# Patient Record
Sex: FEMALE | Race: WHITE | NOT HISPANIC OR LATINO | Employment: OTHER | URBAN - NONMETROPOLITAN AREA
[De-identification: names, ages, dates, MRNs, and addresses within clinical notes are randomized per-mention and may not be internally consistent; named-entity substitution may affect disease eponyms.]

---

## 2022-05-19 ENCOUNTER — OFFICE VISIT (OUTPATIENT)
Dept: NEUROLOGY | Facility: OTHER | Age: 37
End: 2022-05-19
Attending: PSYCHIATRY & NEUROLOGY
Payer: COMMERCIAL

## 2022-05-19 VITALS
OXYGEN SATURATION: 99 % | DIASTOLIC BLOOD PRESSURE: 72 MMHG | WEIGHT: 158 LBS | RESPIRATION RATE: 16 BRPM | SYSTOLIC BLOOD PRESSURE: 130 MMHG | TEMPERATURE: 97.4 F | HEART RATE: 80 BPM

## 2022-05-19 DIAGNOSIS — M79.661 PAIN OF RIGHT LOWER LEG: Primary | ICD-10-CM

## 2022-05-19 DIAGNOSIS — M79.641 PAIN OF RIGHT HAND: ICD-10-CM

## 2022-05-19 DIAGNOSIS — G83.11 MONOPLEGIA OF LOWER EXTREMITY DUE TO NONCEREBROVASCULAR ETIOLOGY AFFECTING RIGHT DOMINANT SIDE (H): ICD-10-CM

## 2022-05-19 PROBLEM — K21.9 GASTROESOPHAGEAL REFLUX DISEASE: Status: ACTIVE | Noted: 2019-09-26

## 2022-05-19 PROBLEM — H51.11 CI (CONVERGENCE INSUFFICIENCY): Status: ACTIVE | Noted: 2018-07-11

## 2022-05-19 PROBLEM — H52.203 HYPEROPIA OF BOTH EYES WITH ASTIGMATISM: Status: ACTIVE | Noted: 2018-07-11

## 2022-05-19 PROBLEM — N13.70 URETERAL REFLUX: Status: ACTIVE | Noted: 2017-11-07

## 2022-05-19 PROBLEM — H52.03 HYPEROPIA OF BOTH EYES WITH ASTIGMATISM: Status: ACTIVE | Noted: 2018-07-11

## 2022-05-19 PROBLEM — R14.0 ABDOMINAL BLOATING WITH CRAMPS: Status: ACTIVE | Noted: 2020-07-08

## 2022-05-19 PROBLEM — H50.52 EXOPHORIA: Status: ACTIVE | Noted: 2018-07-11

## 2022-05-19 PROBLEM — R10.9 ABDOMINAL BLOATING WITH CRAMPS: Status: ACTIVE | Noted: 2020-07-08

## 2022-05-19 PROCEDURE — 95886 MUSC TEST DONE W/N TEST COMP: CPT | Performed by: PSYCHIATRY & NEUROLOGY

## 2022-05-19 PROCEDURE — 99204 OFFICE O/P NEW MOD 45 MIN: CPT | Mod: 25 | Performed by: PSYCHIATRY & NEUROLOGY

## 2022-05-19 PROCEDURE — 95913 NRV CNDJ TEST 13/> STUDIES: CPT | Performed by: PSYCHIATRY & NEUROLOGY

## 2022-05-19 RX ORDER — ASENAPINE 5 MG/1
TABLET SUBLINGUAL
COMMUNITY
Start: 2021-03-07

## 2022-05-19 RX ORDER — CLONAZEPAM 1 MG/1
1 TABLET ORAL
COMMUNITY

## 2022-05-19 RX ORDER — PROCHLORPERAZINE MALEATE 10 MG
10 TABLET ORAL
COMMUNITY
Start: 2021-03-19

## 2022-05-19 RX ORDER — ONDANSETRON 4 MG/1
4 TABLET, ORALLY DISINTEGRATING ORAL
COMMUNITY
Start: 2021-03-19

## 2022-05-19 RX ORDER — PANTOPRAZOLE SODIUM 40 MG/1
40 TABLET, DELAYED RELEASE ORAL
COMMUNITY
Start: 2022-04-29

## 2022-05-19 RX ORDER — ASENAPINE MALEATE 2.5 MG/1
2.5 TABLET SUBLINGUAL
COMMUNITY
Start: 2020-08-14

## 2022-05-19 ASSESSMENT — PAIN SCALES - GENERAL: PAINLEVEL: MILD PAIN (2)

## 2022-05-19 NOTE — PROGRESS NOTES
Visit Date: 05/19/2022    NEURODIAGNOSTICS CONSULTATION    REFERRING PHYSICIAN:  Dr. Tate Vela    HISTORY OF PRESENT ILLNESS:  The patient is a 36-year-old who relates that 5 years ago she had surgery on the right knee and following the surgery, she had complete paralysis and anesthesia of the leg from the knee downward.  Symptoms improved, but she still complains of weakness in the lower extremity and sensory loss from the knee down.  Three months ago, she says she developed diffuse pain and paresthesia of the right upper extremity.  This does not follow the field of any nerve or nerve root.  She relates a 1-month history of similar, but milder symptoms on the left.    PAST MEDICAL HISTORY:  Seemingly noncontributory.  The patient is currently on no medication.    REVIEW OF SYSTEMS:  A 10-system review of systems is positive for reported chronic neck and low back pain.    PHYSICAL EXAMINATION:  The patient is 61-1/2 inches tall and weighs 158 pounds.  Strength is 5/5 throughout in the upper extremities and the left lower extremity.  Though there was no suggestion of right lower extremity weakness during ambulation or EMG, she showed just 3/5 strength for the hamstrings and hip flexors and 2/5 strength for the quadriceps and plantar flexors of the foot, though curiously 5/5 strength for the dorsiflexors of the foot.  Reflexes were normoactive and symmetric throughout the upper and lower extremities.  Plantar responses were downgoing.  Gait was normal.  Pinprick was well preserved throughout.    NERVE CONDUCTION STUDIES:  Motor nerve conduction testing was performed for the right peroneal, tibial, median, and ulnar nerves.  Distal latencies, amplitudes, conduction velocities, and H-reflexes were well within normal limits.    Orthodromic mixed conduction studies were performed for the right median and ulnar nerves.  Antidromic sensory testing was performed for the right sural, peroneal, second digital,  fifth digital, and radial nerves.  Latencies, amplitudes, and conduction velocities were normal.    MONOPOLAR EMG NEEDLE EXAMINATION:  Monopolar needle exam was performed for the right first dorsal interosseous, extensor digitorum communis, flexor carpi radialis, triceps, brachioradialis, iliopsoas, vastus lateralis, tibialis anterior, gastrocnemius, and extensor hallucis longus.  All of the tested muscles showed normal insertional activity.  Motor units were normal in size with normal recruitment and interference.    IMPRESSION:  The patient's neurodiagnostic study of the right upper and right lower extremity is completely normal.  There is no evidence for focal or generalized neuropathy nor radiculopathy at any level that could potentially correlate to the patient's symptoms.    The symptoms as described by the patient are not entirely anatomic.  Her physical examination is grossly embellished as described above.  Once the neurodiagnostic study was complete, I went back and tested her for Underwood's sign which she clearly has on the right side.  This suggests an overt attempt to exaggerate her presentation.    The patient is neurologically intact.    Roger Ventura MD        D: 2022   T: 2022   MT: [id number]    Name:     AKIRA VALENCIA  MRN:      [id number]        Account:    [id number]   :      1985           Visit Date: 2022     Document: [id number]    cc:  Tate Vela MD

## 2022-05-19 NOTE — NURSING NOTE
Chief Complaint   Patient presents with     Numbness     Bilateral Upper extremities numbness       Initial /72 (BP Location: Right arm, Patient Position: Sitting, Cuff Size: Adult Regular)   Pulse 80   Temp 97.4  F (36.3  C) (Tympanic)   Resp 16   Wt 71.7 kg (158 lb)   LMP  (LMP Unknown)   SpO2 99%  There is no height or weight on file to calculate BMI.  Medication Reconciliation: complete    Aide Lloyd LPN    Advance Care Directive reviewed

## 2022-05-21 ENCOUNTER — HEALTH MAINTENANCE LETTER (OUTPATIENT)
Age: 37
End: 2022-05-21

## 2022-09-17 ENCOUNTER — HEALTH MAINTENANCE LETTER (OUTPATIENT)
Age: 37
End: 2022-09-17

## 2023-06-04 ENCOUNTER — HEALTH MAINTENANCE LETTER (OUTPATIENT)
Age: 38
End: 2023-06-04

## 2024-07-13 ENCOUNTER — HEALTH MAINTENANCE LETTER (OUTPATIENT)
Age: 39
End: 2024-07-13